# Patient Record
Sex: FEMALE
[De-identification: names, ages, dates, MRNs, and addresses within clinical notes are randomized per-mention and may not be internally consistent; named-entity substitution may affect disease eponyms.]

---

## 2018-03-11 ENCOUNTER — HOSPITAL ENCOUNTER (EMERGENCY)
Dept: HOSPITAL 88 - ER | Age: 38
Discharge: LEFT BEFORE DISCHARGE | End: 2018-03-11
Payer: COMMERCIAL

## 2018-03-11 DIAGNOSIS — E80.7: Primary | ICD-10-CM

## 2019-02-02 ENCOUNTER — HOSPITAL ENCOUNTER (INPATIENT)
Dept: HOSPITAL 88 - ER | Age: 39
LOS: 2 days | Discharge: HOME | DRG: 871 | End: 2019-02-04
Attending: INTERNAL MEDICINE | Admitting: INTERNAL MEDICINE
Payer: COMMERCIAL

## 2019-02-02 VITALS — SYSTOLIC BLOOD PRESSURE: 147 MMHG | DIASTOLIC BLOOD PRESSURE: 76 MMHG

## 2019-02-02 VITALS — SYSTOLIC BLOOD PRESSURE: 139 MMHG | DIASTOLIC BLOOD PRESSURE: 64 MMHG

## 2019-02-02 VITALS — DIASTOLIC BLOOD PRESSURE: 71 MMHG | SYSTOLIC BLOOD PRESSURE: 139 MMHG

## 2019-02-02 VITALS — SYSTOLIC BLOOD PRESSURE: 139 MMHG | DIASTOLIC BLOOD PRESSURE: 71 MMHG

## 2019-02-02 VITALS — HEIGHT: 64 IN | WEIGHT: 212 LBS | BODY MASS INDEX: 36.19 KG/M2

## 2019-02-02 DIAGNOSIS — E03.9: ICD-10-CM

## 2019-02-02 DIAGNOSIS — Z79.4: ICD-10-CM

## 2019-02-02 DIAGNOSIS — I10: ICD-10-CM

## 2019-02-02 DIAGNOSIS — Z83.3: ICD-10-CM

## 2019-02-02 DIAGNOSIS — K76.9: ICD-10-CM

## 2019-02-02 DIAGNOSIS — A41.9: Primary | ICD-10-CM

## 2019-02-02 DIAGNOSIS — E11.65: ICD-10-CM

## 2019-02-02 DIAGNOSIS — K76.0: ICD-10-CM

## 2019-02-02 DIAGNOSIS — J18.9: ICD-10-CM

## 2019-02-02 DIAGNOSIS — E86.0: ICD-10-CM

## 2019-02-02 DIAGNOSIS — R16.0: ICD-10-CM

## 2019-02-02 DIAGNOSIS — Z88.8: ICD-10-CM

## 2019-02-02 DIAGNOSIS — R19.03: ICD-10-CM

## 2019-02-02 DIAGNOSIS — Z82.49: ICD-10-CM

## 2019-02-02 LAB
ALBUMIN SERPL-MCNC: 4.1 G/DL (ref 3.5–5)
ALBUMIN/GLOB SERPL: 1.2 {RATIO} (ref 0.8–2)
ALP SERPL-CCNC: 104 IU/L (ref 40–150)
ALT SERPL-CCNC: 102 IU/L (ref 0–55)
AMYLASE SERPL-CCNC: 25 U/L (ref 25–125)
ANION GAP SERPL CALC-SCNC: 17.1 MMOL/L (ref 8–16)
BACTERIA URNS QL MICRO: (no result) /HPF
BASOPHILS # BLD AUTO: 0 10*3/UL (ref 0–0.1)
BASOPHILS NFR BLD AUTO: 0.4 % (ref 0–1)
BILIRUB UR QL: NEGATIVE
BUN SERPL-MCNC: 8 MG/DL (ref 7–26)
BUN/CREAT SERPL: 9 (ref 6–25)
CALCIUM SERPL-MCNC: 9.5 MG/DL (ref 8.4–10.2)
CHLORIDE SERPL-SCNC: 98 MMOL/L (ref 98–107)
CLARITY UR: CLEAR
CO2 SERPL-SCNC: 23 MMOL/L (ref 22–29)
COLOR UR: YELLOW
DEPRECATED NEUTROPHILS # BLD AUTO: 6.3 10*3/UL (ref 2.1–6.9)
DEPRECATED RBC URNS MANUAL-ACNC: (no result) /HPF (ref 0–5)
EGFRCR SERPLBLD CKD-EPI 2021: > 60 ML/MIN (ref 60–?)
EOSINOPHIL # BLD AUTO: 0.1 10*3/UL (ref 0–0.4)
EOSINOPHIL NFR BLD AUTO: 1.3 % (ref 0–6)
EPI CELLS URNS QL MICRO: (no result) /LPF
ERYTHROCYTE [DISTWIDTH] IN CORD BLOOD: 12.2 % (ref 11.7–14.4)
GLOBULIN PLAS-MCNC: 3.3 G/DL (ref 2.3–3.5)
GLUCOSE SERPLBLD-MCNC: 194 MG/DL (ref 74–118)
HCG UR QL: NEGATIVE
HCT VFR BLD AUTO: 42.4 % (ref 34.2–44.1)
HGB BLD-MCNC: 15.1 G/DL (ref 12–16)
KETONES UR QL STRIP.AUTO: NEGATIVE
LEUKOCYTE ESTERASE UR QL STRIP.AUTO: NEGATIVE
LIPASE SERPL-CCNC: 31 U/L (ref 8–78)
LYMPHOCYTES # BLD: 1.1 10*3/UL (ref 1–3.2)
LYMPHOCYTES NFR BLD AUTO: 13.4 % (ref 18–39.1)
MCH RBC QN AUTO: 30.4 PG (ref 28–32)
MCHC RBC AUTO-ENTMCNC: 35.6 G/DL (ref 31–35)
MCV RBC AUTO: 85.5 FL (ref 81–99)
MONOCYTES # BLD AUTO: 0.6 10*3/UL (ref 0.2–0.8)
MONOCYTES NFR BLD AUTO: 7.1 % (ref 4.4–11.3)
NEUTS SEG NFR BLD AUTO: 77.2 % (ref 38.7–80)
NITRITE UR QL STRIP.AUTO: NEGATIVE
PLATELET # BLD AUTO: 401 X10E3/UL (ref 140–360)
POTASSIUM SERPL-SCNC: 4.1 MMOL/L (ref 3.5–5.1)
PROT UR QL STRIP.AUTO: NEGATIVE
RBC # BLD AUTO: 4.96 X10E6/UL (ref 3.6–5.1)
SODIUM SERPL-SCNC: 134 MMOL/L (ref 136–145)
SP GR UR STRIP: 1 (ref 1.01–1.02)
UROBILINOGEN UR STRIP-MCNC: 0.2 MG/DL (ref 0.2–1)
WBC #/AREA URNS HPF: (no result) /HPF (ref 0–5)

## 2019-02-02 PROCEDURE — 93005 ELECTROCARDIOGRAM TRACING: CPT

## 2019-02-02 PROCEDURE — 87040 BLOOD CULTURE FOR BACTERIA: CPT

## 2019-02-02 PROCEDURE — 81001 URINALYSIS AUTO W/SCOPE: CPT

## 2019-02-02 PROCEDURE — 83690 ASSAY OF LIPASE: CPT

## 2019-02-02 PROCEDURE — 80053 COMPREHEN METABOLIC PANEL: CPT

## 2019-02-02 PROCEDURE — 87400 INFLUENZA A/B EACH AG IA: CPT

## 2019-02-02 PROCEDURE — 85025 COMPLETE CBC W/AUTO DIFF WBC: CPT

## 2019-02-02 PROCEDURE — 71046 X-RAY EXAM CHEST 2 VIEWS: CPT

## 2019-02-02 PROCEDURE — 74183 MRI ABD W/O CNTR FLWD CNTR: CPT

## 2019-02-02 PROCEDURE — 85610 PROTHROMBIN TIME: CPT

## 2019-02-02 PROCEDURE — 99284 EMERGENCY DEPT VISIT MOD MDM: CPT

## 2019-02-02 PROCEDURE — 83735 ASSAY OF MAGNESIUM: CPT

## 2019-02-02 PROCEDURE — 36415 COLL VENOUS BLD VENIPUNCTURE: CPT

## 2019-02-02 PROCEDURE — 83605 ASSAY OF LACTIC ACID: CPT

## 2019-02-02 PROCEDURE — 82948 REAGENT STRIP/BLOOD GLUCOSE: CPT

## 2019-02-02 PROCEDURE — 74177 CT ABD & PELVIS W/CONTRAST: CPT

## 2019-02-02 PROCEDURE — 82150 ASSAY OF AMYLASE: CPT

## 2019-02-02 PROCEDURE — 84443 ASSAY THYROID STIM HORMONE: CPT

## 2019-02-02 PROCEDURE — 87070 CULTURE OTHR SPECIMN AEROBIC: CPT

## 2019-02-02 PROCEDURE — 87086 URINE CULTURE/COLONY COUNT: CPT

## 2019-02-02 PROCEDURE — 81025 URINE PREGNANCY TEST: CPT

## 2019-02-02 PROCEDURE — 83518 IMMUNOASSAY DIPSTICK: CPT

## 2019-02-02 RX ADMIN — SODIUM CHLORIDE ONE MLS/HR: 9 INJECTION, SOLUTION INTRAVENOUS at 13:30

## 2019-02-02 RX ADMIN — BENZONATATE SCH MG: 100 CAPSULE ORAL at 20:36

## 2019-02-02 RX ADMIN — BENZONATATE SCH MG: 100 CAPSULE ORAL at 14:50

## 2019-02-02 RX ADMIN — METOPROLOL TARTRATE SCH MG: 25 TABLET, FILM COATED ORAL at 22:00

## 2019-02-02 RX ADMIN — SODIUM CHLORIDE ONE MLS/HR: 9 INJECTION, SOLUTION INTRAVENOUS at 13:49

## 2019-02-02 RX ADMIN — INSULIN LISPRO SCH UNIT: 100 INJECTION, SOLUTION INTRAVENOUS; SUBCUTANEOUS at 20:37

## 2019-02-02 RX ADMIN — SODIUM CHLORIDE SCH MLS/HR: 900 INJECTION, SOLUTION INTRAVENOUS at 14:12

## 2019-02-02 RX ADMIN — METOPROLOL TARTRATE SCH MG: 25 TABLET, FILM COATED ORAL at 14:50

## 2019-02-02 RX ADMIN — INSULIN LISPRO SCH UNIT: 100 INJECTION, SOLUTION INTRAVENOUS; SUBCUTANEOUS at 16:30

## 2019-02-02 NOTE — DIAGNOSTIC IMAGING REPORT
CT Abdomen And Pelvis with Intravenous Contrast



INDICATION:   

^ABD PAIN, N/V, ELEV LFT'S



TECHNIQUE: Thin collimation axial images obtained from the diaphragm to the

level of the pubic symphysis following the uneventful administration of  100 cc

of low osmolar, nonionic intravenous contrast.



Dose reduction techniques used: Automated exposure control, adjustment of the

mAs and/or kVp according to patient size, standardized low-dose protocol,

and/or iterative reconstruction technique.





RADIATION DOSE:

     Total DLP: 709.6 mGy*cm    

     Estimated effective dose: (DLP x 0.015 x size factor) mSv

     CTDIvol has been reviewed. It is below the limits set by the Radiation

Protocol Committee (RPC).



COMPARISON: CT abdomen/pelvis 3/14/2013, CTA abdomen/pelvis 5/29/2013.

 

ABDOMEN FINDINGS:



Lung Bases: Nodule in the anterior basal segment of the right lower lobe is

stable and measures 3 mm. No pulmonary infiltrates. Visualized portion of the

mediastinum is normal.



Liver: Decreased attenuation consistent with steatosis. The right lobe measures

23 cm in length. 



*  New predominantly hypoattenuating, heterogeneously enhancing lesion in

segment 2 near the capsule measures 2.6 x 3.9 cm. 

*  New focus of hypoattenuation in the anterior aspect of segment 4 measures

2.8 x 1.4 cm in the coronal plane. There is a nodular focus of increased

attenuation at its superior aspect (coronal image 23).

*  New predominantly hypoattenuating, heterogeneously enhancing lesion in

segment 6 measures 4.2 x 2.1 cm adjacent to the posterior capsule. 

*  New hypoattenuating lesion in segment 5 is somewhat curvilinear in

appearance and extends for approximately 3 cm.

*  New subcapsular focus of hypoattenuation the medial aspect of segment 6

measures 11 mm (series 2, image 31).

*  New subcapsular focus of hypoattenuation in segment 2/3 (series 2, image

29).



No perihepatic fluid collection or inflammation.



Gallbladder: Present and appears normal. 



Biliary tree: No biliary ductal dilatation.



Pancreas: Normal attenuation without mass or ductal dilatation.



Spleen: Top normal in size.  No evidence of mass.



Adrenal Glands: No evidence for mass.



Kidneys: 



Right: Normal enhancement.  No soft tissue mass.  No hydronephrosis.



Left:  Normal enhancement.  No soft tissue mass.  No hydronephrosis.



Lymph Nodes: No enlarged abdominal or retroperitoneal lymph nodes.



Aorta:   Normal in diameter.

Portal vein: Measures 16 mm in diameter and is widely patent.



PELVIS FINDINGS: 



Bowel: 

Stomach:  Normal in caliber with normal wall thickness.

Small Bowel: Normal in caliber with normal wall thickness.  

Large Bowel: Normal in caliber with normal wall thickness.  

Appendix: Normal appendix.



Bladder: Normal.



The uterus is present and normal in morphology. No adnexal mass.



Peritoneum/retroperitoneum: No free fluid or fluid collection.



Bones: Unremarkable for age.



Soft tissues: Anterior abdominal wall subcutaneous mass in the right lower

quadrant measures 2.1 x 2.5 cm (previously, 1.3 x 1.8 cm).  No new subcutaneous

masses.



IMPRESSION:



1.   Steatosis and hepatomegaly. New heterogeneously enhancing masses

throughout the parenchyma. This may be the sequela of liver injury or the

result of focal fat deposition. Another entity to consider would be a

neoplastic process given increasing size of the abdominal wall skin mass.

Recommend correlation with trauma history and MRI dedicated to the liver on an

outpatient basis for further evaluation. 



2.   Enlarging right lower quadrant abdominal wall mass of uncertain etiology.

Biopsy/excision is recommended.



3. Top normal size of the spleen and enlarged portal vein suggestive of portal

hypertension. 



4. No evidence of lymphadenopathy.



5. No evidence for bowel obstruction or inflammation. Normal appendix.



Signed by: Dr. Wiliam Armenta MD on 2/2/2019 1:13 PM

## 2019-02-02 NOTE — XMS REPORT
Patient Summary Document

                             Created on: 2019



TALHA CHRISTIAN

External Reference #: [id number]

: 1980

Sex: Female



Demographics







                          Address                   [address] [place] [address]

 

                          Home Phone                [phone]

 

                          Preferred Language        Unknown

 

                          Marital Status            Unknown

 

                          Restorationist Affiliation     Unknown

 

                          Race                      Unknown

 

                                        Additional Race(s)  

 

                          Ethnic Group              Unknown





Author







                          Author                    [facility]

 

                          Address                   Unknown

 

                          Phone                     Unavailable







Care Team Providers







                    Care Team Member Name    Role                Phone

 

                    SWEET, A LAIRD      Unavailable         Unavailable







Problems

This patient has no known problems.



Allergies, Adverse Reactions, Alerts

This patient has no known allergies or adverse reactions.



Medications

This patient has no known medications.



Results







           Test Description    Test Time    Test Comments    Text Results    Atomic Results    Result

 Comments

 

                CT ABDOMEN/PELVIS W    2019 12:59:00                                                       

                                                   [place] [address]      Patient Name: TALHA CHRISTIAN                                
  MR #: [id number]                     : 1980                          
        Age/Sex: 38/F  Acct #: [id number]                              Req #: 
[id number]  Adm Physician:                                                      
Ordered by: GEORGIA SILVA MD                            Report #: [id number]    
   Location: ER                                      Room/Bed:                  
  
___________________________________________________________________________________________________
   Procedure: [id number] CT/CT ABDOMEN/PELVIS W  Exam Date:                      
      Exam Time:                                               REPORT STATUS: 
Signed    CT Abdomen And Pelvis with Intravenous Contrast      INDICATION:      
ABD PAIN, N/V, ELEV LFT'S      TECHNIQUE: Thin collimation axial images obtained
from the diaphragm to the   level of the pubic symphysis following the 
uneventful administration of  100 cc   of low osmolar, nonionic intravenous 
contrast.      Dose reduction techniques used: Automated exposure control, 
adjustment of the   mAs and/or kVp according to patient size, standardized low-
dose protocol,   and/or iterative reconstruction technique.         RADIATION 
DOSE:        Total DLP: 709.6 mGy*cm            Estimated effective dose: (DLP x
0.015 x size factor) mSv        CTDIvol has been reviewed. It is below the 
limits set by the Radiation   Protocol Committee (RPC).      COMPARISON: CT 
abdomen/pelvis 3/14/2013, CTA abdomen/pelvis 2013.       ABDOMEN FINDINGS: 
    Lung Bases: Nodule in the anterior basal segment of the right lower lobe is 
 stable and measures 3 mm. No pulmonary infiltrates. Visualized portion of the  
mediastinum is normal.      Liver: Decreased attenuation consistent with 
steatosis. The right lobe measures   23 cm in length.       *  New predominantly
hypoattenuating, heterogeneously enhancing lesion in   segment 2 near the 
capsule measures 2.6 x 3.9 cm.    *  New focus of hypoattenuation in the 
anterior aspect of segment 4 measures   2.8 x 1.4 cm in the coronal plane. There
is a nodular focus of increased   attenuation at its superior aspect (coronal 
image 23).   *  New predominantly hypoattenuating, heterogeneously enhancing 
lesion in   segment 6 measures 4.2 x 2.1 cm adjacent to the posterior capsule.  
 *  New hypoattenuating lesion in segment 5 is somewhat curvilinear in   
appearance and extends for approximately 3 cm.   *  New subcapsular focus of 
hypoattenuation the medial aspect of segment 6   measures 11 mm (series 2, image
31).   *  New subcapsular focus of hypoattenuation in segment 2/3 (series 2, 
image   29).      No perihepatic fluid collection or inflammation.      
Gallbladder: Present and appears normal.       Biliary tree: No biliary ductal d
ilatation.      Pancreas: Normal attenuation without mass or ductal dilatation. 
    Spleen: Top normal in size.  No evidence of mass.      Adrenal Glands: No 
evidence for mass.      Kidneys:       Right: Normal enhancement.  No soft 
tissue mass.  No hydronephrosis.      Left:  Normal enhancement.  No soft tissue
mass.  No hydronephrosis.      Lymph Nodes: No enlarged abdominal or 
retroperitoneal lymph nodes.      Aorta:   Normal in diameter.   Portal vein: 
Measures 16 mm in diameter and is widely patent.      PELVIS FINDINGS:       
Bowel:    Stomach:  Normal in caliber with normal wall thickness.   Small Bowel:
Normal in caliber with normal wall thickness.     Large Bowel: Normal in caliber
with normal wall thickness.     Appendix: Normal appendix.      Bladder: Normal.
     The uterus is present and normal in morphology. No adnexal mass.      
Peritoneum/retroperitoneum: No free fluid or fluid collection.      Bones: 
Unremarkable for age.      Soft tissues: Anterior abdominal wall subcutaneous ma
ss in the right lower   quadrant measures 2.1 x 2.5 cm (previously, 1.3 x 1.8 
cm).  No new subcutaneous   masses.      IMPRESSION:      1.   Steatosis and 
hepatomegaly. New heterogeneously enhancing masses   throughout the parenchyma. 
This may be the sequela of liver injury or the   result of focal fat deposition.
Another entity to consider would be a   neoplastic process given increasing size
of the abdominal wall skin mass.   Recommend correlation with trauma history and
MRI dedicated to the liver on an   outpatient basis for further evaluation.     
 2.   Enlarging right lower quadrant abdominal wall mass of uncertain etiology. 
 Biopsy/excision is recommended.      3. Top normal size of the spleen and 
enlarged portal vein suggestive of portal   hypertension.       4. No evidence 
of lymphadenopathy.      5. No evidence for bowel obstruction or inflammation. 
Normal appendix.      Signed by: Dr. Gulshan Armenta MD on 2019 1:13 PM  
     Dictated By: GULSHAN ARMENTA MD  Electronically Signed By: GULSHAN ARMENTA MD on 19 1313  Transcribed By: ALYSSA on 19 1313       COPY 
TO:   GEORGIA SILVA MD                   

 

                CHEST 2 VIEWS    2019 11:32:00                                                             

                                             [place] [address]  
   Patient Name: TALHA CHRISTIAN                                   MR #: 
[id number]                     : 1980                                  
Age/Sex: 38/F  Acct #: [id number]                              Req #: [id number]
[id number]  Adm Physician:                                                      
Ordered by: GEORGIA SILVA MD                            Report #: [id number]    
   Location: ER                                      Room/Bed:                  
  
___________________________________________________________________________________________________
   Procedure: [id number] DX/CHEST 2 VIEWS  Exam Date:                            
Exam Time:                                               REPORT STATUS: Signed  
 EXAMINATION:  CHEST 2 VIEWS          INDICATION:     SEPSIS    Y      CO
MPARISON:  Chest x-ray 2013           FINDINGS:  PA and lateral views.  The
patient's arms overlie the upper chest.      TUBES and LINES:  None.      LUNGS:
 Lungs are well inflated.  Right infrahilar airspace opacity corresponds   to 
the right lower lobe. Left lung is clear.      PLEURA:  No pleural effusion or 
pneumothorax.      HEART AND MEDIASTINUM:  The cardiomediastinal silhouette is 
unremarkable..        BONES AND SOFT TISSUES:  No focal osseous lesions.   Soft 
tissues are   unremarkable.      UPPER ABDOMEN: No free air under the diaphragm.
      IMPRESSION:    Right lower lobe airspace opacity is suggestive of 
pneumonia. Recommend close   interval follow-up.         Signed by: Dr. Gulshan Armenat MD on 2019 11:34 AM        Dictated By: GULSHAN ARMENTA MD  
Electronically Signed By: GULSHAN ARMENTA MD on 19 1134  Transcribed By:
ALYSSA on 19 1134       COPY TO:   GEORGIA SILVA MD

## 2019-02-02 NOTE — NUR
This 38 y/0 female admitted the unit from the ER with dx of pneumonia, 
dehydration,nausea,vomiting and heart rate in 120 range/ The pt arrived with tele and  
monitor in place Sinus tach at 118. There is an iv to the right ac with n/s infusing @150 
cc/hr. Blood glucose 184. Antibiotic is infusing as well. The pt. is awake, alert and 
oriented times 4. She was initiated to the room.

## 2019-02-02 NOTE — DIAGNOSTIC IMAGING REPORT
EXAMINATION:  CHEST 2 VIEWS    



INDICATION: 

^SEPSIS

^Y



COMPARISON:  Chest x-ray 5/29/2013

     

FINDINGS:  PA and lateral views.  The patient's arms overlie the upper chest.



TUBES and LINES:  None.



LUNGS:  Lungs are well inflated.  Right infrahilar airspace opacity corresponds

to the right lower lobe. Left lung is clear.



PLEURA:  No pleural effusion or pneumothorax.



HEART AND MEDIASTINUM:  The cardiomediastinal silhouette is unremarkable..  



BONES AND SOFT TISSUES:  No focal osseous lesions.   Soft tissues are

unremarkable.



UPPER ABDOMEN: No free air under the diaphragm. 



IMPRESSION: 

Right lower lobe airspace opacity is suggestive of pneumonia. Recommend close

interval follow-up.





Signed by: Dr. Wiliam Armenta MD on 2/2/2019 11:34 AM

## 2019-02-03 VITALS — SYSTOLIC BLOOD PRESSURE: 122 MMHG | DIASTOLIC BLOOD PRESSURE: 69 MMHG

## 2019-02-03 VITALS — SYSTOLIC BLOOD PRESSURE: 100 MMHG | DIASTOLIC BLOOD PRESSURE: 52 MMHG

## 2019-02-03 VITALS — DIASTOLIC BLOOD PRESSURE: 59 MMHG | SYSTOLIC BLOOD PRESSURE: 112 MMHG

## 2019-02-03 VITALS — DIASTOLIC BLOOD PRESSURE: 68 MMHG | SYSTOLIC BLOOD PRESSURE: 123 MMHG

## 2019-02-03 VITALS — SYSTOLIC BLOOD PRESSURE: 139 MMHG | DIASTOLIC BLOOD PRESSURE: 65 MMHG

## 2019-02-03 LAB
ALBUMIN SERPL-MCNC: 3.3 G/DL (ref 3.5–5)
ALBUMIN/GLOB SERPL: 1.2 {RATIO} (ref 0.8–2)
ALP SERPL-CCNC: 84 IU/L (ref 40–150)
ALT SERPL-CCNC: 85 IU/L (ref 0–55)
ANION GAP SERPL CALC-SCNC: 13.8 MMOL/L (ref 8–16)
BASOPHILS # BLD AUTO: 0 10*3/UL (ref 0–0.1)
BASOPHILS NFR BLD AUTO: 0.1 % (ref 0–1)
BUN SERPL-MCNC: 6 MG/DL (ref 7–26)
BUN/CREAT SERPL: 7 (ref 6–25)
CALCIUM SERPL-MCNC: 8.3 MG/DL (ref 8.4–10.2)
CHLORIDE SERPL-SCNC: 103 MMOL/L (ref 98–107)
CO2 SERPL-SCNC: 23 MMOL/L (ref 22–29)
DEPRECATED INR PLAS: 1
DEPRECATED NEUTROPHILS # BLD AUTO: 4.2 10*3/UL (ref 2.1–6.9)
EGFRCR SERPLBLD CKD-EPI 2021: > 60 ML/MIN (ref 60–?)
EOSINOPHIL # BLD AUTO: 0 10*3/UL (ref 0–0.4)
EOSINOPHIL NFR BLD AUTO: 0.6 % (ref 0–6)
ERYTHROCYTE [DISTWIDTH] IN CORD BLOOD: 12.3 % (ref 11.7–14.4)
GLOBULIN PLAS-MCNC: 2.8 G/DL (ref 2.3–3.5)
GLUCOSE SERPLBLD-MCNC: 210 MG/DL (ref 74–118)
HCT VFR BLD AUTO: 38 % (ref 34.2–44.1)
HGB BLD-MCNC: 12.7 G/DL (ref 12–16)
LYMPHOCYTES # BLD: 1.8 10*3/UL (ref 1–3.2)
LYMPHOCYTES NFR BLD AUTO: 26.6 % (ref 18–39.1)
MCH RBC QN AUTO: 30 PG (ref 28–32)
MCHC RBC AUTO-ENTMCNC: 33.4 G/DL (ref 31–35)
MCV RBC AUTO: 89.6 FL (ref 81–99)
MONOCYTES # BLD AUTO: 0.6 10*3/UL (ref 0.2–0.8)
MONOCYTES NFR BLD AUTO: 9.3 % (ref 4.4–11.3)
NEUTS SEG NFR BLD AUTO: 62.8 % (ref 38.7–80)
PLATELET # BLD AUTO: 307 X10E3/UL (ref 140–360)
POTASSIUM SERPL-SCNC: 3.8 MMOL/L (ref 3.5–5.1)
PROTHROMBIN TIME: 14.1 SECONDS (ref 11.9–14.5)
RBC # BLD AUTO: 4.24 X10E6/UL (ref 3.6–5.1)
SODIUM SERPL-SCNC: 136 MMOL/L (ref 136–145)

## 2019-02-03 RX ADMIN — INSULIN LISPRO SCH UNIT: 100 INJECTION, SOLUTION INTRAVENOUS; SUBCUTANEOUS at 20:59

## 2019-02-03 RX ADMIN — CEFTRIAXONE SCH MLS/HR: 100 INJECTION, POWDER, FOR SOLUTION INTRAVENOUS at 09:00

## 2019-02-03 RX ADMIN — INSULIN LISPRO SCH UNIT: 100 INJECTION, SOLUTION INTRAVENOUS; SUBCUTANEOUS at 16:30

## 2019-02-03 RX ADMIN — BENZONATATE SCH MG: 100 CAPSULE ORAL at 15:00

## 2019-02-03 RX ADMIN — BENZONATATE SCH MG: 100 CAPSULE ORAL at 20:58

## 2019-02-03 RX ADMIN — BENZONATATE SCH MG: 100 CAPSULE ORAL at 09:00

## 2019-02-03 RX ADMIN — SODIUM CHLORIDE SCH MLS/HR: 900 INJECTION, SOLUTION INTRAVENOUS at 09:20

## 2019-02-03 RX ADMIN — INSULIN LISPRO SCH UNIT: 100 INJECTION, SOLUTION INTRAVENOUS; SUBCUTANEOUS at 07:30

## 2019-02-03 RX ADMIN — INSULIN LISPRO SCH UNIT: 100 INJECTION, SOLUTION INTRAVENOUS; SUBCUTANEOUS at 11:30

## 2019-02-03 NOTE — NUR
patient received. Patient is resting in bed. Resp even and unlabored. No acute distress 
noted. Patient denies of any pain or discomfort. tele in place. family at bed time. call 
light within reach. instruct to call for assistance. bed low/locked. continue to monitor 
closely

## 2019-02-03 NOTE — NUR
RCD PT AT BED PT IS ALERT AND ORIENTED  ASSESSMENT DONE PT RESTING ON BED NO SIGNS OF ANY 
DISTRESS NOTED IV PATENT BED LOW AND LOCKED CALL LIGHT IN REACH

## 2019-02-03 NOTE — NUR
Patient laying in bed with HOB slightly elevated.  AAO x 4.  No sob noted.  No acute 
distress noted.  Patient reports of 0/10 for pain.  Bed at low position and locked.  Call 
light within reach and reminded patient to utilize when help is needed.  Patient in stable 
condition and will continue to monitor.  Patient noted with temp at 103.5 and HR at 140, 
also noted covered under 6 blankets and room temp very hot.  Removed blankets, lower room 
temp and rechecked in 30mins and temp at 100.6 and HR at 122.  Patient reports of no chest 
pain, no headache.

## 2019-02-03 NOTE — NUR
pt c/o pain on iv site notified the charge nurse to start the iv since she is hard sticker 
in er they used by ultrasound and get  it

## 2019-02-03 NOTE — NUR
SOCIAL WORK INITIAL ASSESSMENT 

 met with patient at bedside to discuss plan of care with patient/family. CM/SW 
role and care transitions discussed. 

Patient lives: with her two children (age 6 and 8) 

Admit/Transfer: Admit

POA/Emergency contact: Cooper Sherwood (boyfriend) [phone]

Current/Previous Home Health: N/A

PCP/Follow-up Care: Does not have Dr. Vipul Mancera

Current/Previous DME:  N/A

Other Services: 

Employment Status:  works for [employer]

Areas of Concerns: 

Referral Needs: 

Education Needs: 

IMM/MOON given and signed (if applicable): 

Goal for discharge:

Patient states children are being cared for by her boyfriends sister while in the hospital. 
Patient states she was independent of all ADL goal prior to hospital admission.

## 2019-02-04 VITALS — SYSTOLIC BLOOD PRESSURE: 119 MMHG | DIASTOLIC BLOOD PRESSURE: 68 MMHG

## 2019-02-04 VITALS — SYSTOLIC BLOOD PRESSURE: 150 MMHG | DIASTOLIC BLOOD PRESSURE: 70 MMHG

## 2019-02-04 VITALS — DIASTOLIC BLOOD PRESSURE: 75 MMHG | SYSTOLIC BLOOD PRESSURE: 139 MMHG

## 2019-02-04 VITALS — DIASTOLIC BLOOD PRESSURE: 71 MMHG | SYSTOLIC BLOOD PRESSURE: 153 MMHG

## 2019-02-04 VITALS — DIASTOLIC BLOOD PRESSURE: 86 MMHG | SYSTOLIC BLOOD PRESSURE: 160 MMHG

## 2019-02-04 RX ADMIN — INSULIN LISPRO SCH UNIT: 100 INJECTION, SOLUTION INTRAVENOUS; SUBCUTANEOUS at 11:30

## 2019-02-04 RX ADMIN — INSULIN LISPRO SCH UNIT: 100 INJECTION, SOLUTION INTRAVENOUS; SUBCUTANEOUS at 16:30

## 2019-02-04 RX ADMIN — CEFTRIAXONE SCH MLS/HR: 100 INJECTION, POWDER, FOR SOLUTION INTRAVENOUS at 09:00

## 2019-02-04 RX ADMIN — BENZONATATE SCH MG: 100 CAPSULE ORAL at 09:00

## 2019-02-04 RX ADMIN — INSULIN LISPRO SCH UNIT: 100 INJECTION, SOLUTION INTRAVENOUS; SUBCUTANEOUS at 07:30

## 2019-02-04 RX ADMIN — CEFTRIAXONE SCH MLS/HR: 100 INJECTION, POWDER, FOR SOLUTION INTRAVENOUS at 13:00

## 2019-02-04 NOTE — DISCHARGE SUMMARY
PRIMARY CARE DOCTOR:  Dr. Sandoval Corrales.



FINAL DIAGNOSIS:  Sepsis present on admission due to community-acquired 

pneumonia.



SECONDARY DIAGNOSES

1. Liver lesions on imaging studies.

2. Diabetes.

3. Hypertension.



PROCEDURES/STUDIES PERFORMED

1. Computed tomography of the abdomen and pelvis. 

2. Magnetic resonance imaging of the liver. 



CONSULTANTS:  None.



HISTORY:  Per H&P.



HOSPITAL COURSE:  Patient was aggressively hydrated. She did spike a fever 

of 103. Finally her tachycardia got better. Patient has been afebrile for 

more than 24 hours now. Her influenza was negative. Patient's initial AST 

was 110, ALT was 102. INR is normal. Chest x-ray shows a right lower lobe 

pneumonia. Repeat LFTs were better. A CT shows liver lesions possibly due 

to fatty liver, also cannot rule out a liver injury potentially due to 

hypoperfusion with her sepsis. Currently we are waiting for MRI of the 

liver to be done. If unremarkable, patient will be going home. Her acute 

hepatitis panel is still pending. Patient also has a right lower quadrant 

just above her groin soft-tissue nodule. This was present on a CT scan six 

years ago. Patient stated she has been evaluated by a dermatologist before 

and thought to be a cyst. It is nontender. Patient will be going home on 

five more days of azithromycin. She received IV Rocephin and IV 

azithromycin here. Patient was seen and examined today. I will follow up on 

her hepatitis panel. It took 33 minutes total to discharge this patient.



CONDITION ON DISCHARGE:  Improved.



DISCHARGE MEDICATIONS:  Please see medication reconciliation form.





 



 _________________________________

SEAN NOVA M.D.



DD:  02/04/2019 11:17

DT:  02/04/2019 12:31

Job#:  [id number]

cc:SANDOVAL CORRALES MD

## 2019-02-04 NOTE — NUR
RCD PT AT BED PT IS ALERT AND ORIENTED  ASSESSMENT DONE PT RESTING ON BED NO SIGNS OF ANY 
DISTRESS NOTED BED LOW AND LOCKED CALL LIGHT IN REACH

## 2023-06-15 ENCOUNTER — HOSPITAL ENCOUNTER (EMERGENCY)
Dept: HOSPITAL 88 - ER | Age: 43
Discharge: HOME | End: 2023-06-15
Payer: COMMERCIAL

## 2023-06-15 VITALS — HEIGHT: 64 IN | WEIGHT: 212 LBS | BODY MASS INDEX: 36.19 KG/M2

## 2023-06-15 VITALS
DIASTOLIC BLOOD PRESSURE: 65 MMHG | OXYGEN SATURATION: 99 % | SYSTOLIC BLOOD PRESSURE: 114 MMHG | RESPIRATION RATE: 19 BRPM | HEART RATE: 98 BPM | TEMPERATURE: 97.9 F

## 2023-06-15 DIAGNOSIS — E11.65: ICD-10-CM

## 2023-06-15 DIAGNOSIS — E03.9: ICD-10-CM

## 2023-06-15 DIAGNOSIS — Z20.822: ICD-10-CM

## 2023-06-15 DIAGNOSIS — R94.31: ICD-10-CM

## 2023-06-15 DIAGNOSIS — E86.0: ICD-10-CM

## 2023-06-15 DIAGNOSIS — K76.9: ICD-10-CM

## 2023-06-15 DIAGNOSIS — R11.2: Primary | ICD-10-CM

## 2023-06-15 DIAGNOSIS — R19.7: ICD-10-CM

## 2023-06-15 LAB
ALBUMIN SERPL-MCNC: 4.1 G/DL (ref 3.5–5)
ALBUMIN/GLOB SERPL: 0.9 {RATIO} (ref 0.8–2)
ALP SERPL-CCNC: 99 IU/L (ref 40–150)
ALT SERPL-CCNC: 18 IU/L (ref 0–55)
ANION GAP SERPL CALC-SCNC: 17 MMOL/L (ref 8–16)
BACTERIA URNS QL MICRO: (no result) /HPF
BASOPHILS # BLD AUTO: 0 10*3/UL (ref 0–0.1)
BASOPHILS NFR BLD AUTO: 0.2 % (ref 0–1)
BUN SERPL-MCNC: 13 MG/DL (ref 7–26)
BUN/CREAT SERPL: 13 (ref 6–25)
CALCIUM SERPL-MCNC: 9.2 MG/DL (ref 8.4–10.2)
CHLORIDE SERPL-SCNC: 100 MMOL/L (ref 98–107)
CK SERPL-CCNC: 21 IU/L (ref 29–168)
CLARITY UR: (no result)
CO2 SERPL-SCNC: 21 MMOL/L (ref 22–29)
COLOR UR: YELLOW
DEPRECATED APTT PLAS QN: 29.9 SECONDS (ref 23.8–35.5)
DEPRECATED INR PLAS: 0.97
DEPRECATED NEUTROPHILS # BLD AUTO: 8.7 10*3/UL (ref 2.1–6.9)
DEPRECATED RBC URNS MANUAL-ACNC: (no result) /HPF (ref 0–5)
EOSINOPHIL # BLD AUTO: 0.2 10*3/UL (ref 0–0.4)
EOSINOPHIL NFR BLD AUTO: 1.6 % (ref 0–6)
EPI CELLS URNS QL MICRO: (no result) /LPF
ERYTHROCYTE [DISTWIDTH] IN CORD BLOOD: 12.9 % (ref 11.7–14.4)
GLOBULIN PLAS-MCNC: 4.6 G/DL (ref 2.3–3.5)
GLUCOSE SERPLBLD-MCNC: 219 MG/DL (ref 74–118)
HCT VFR BLD AUTO: 49.2 % (ref 34.2–44.1)
HGB BLD-MCNC: 16.9 G/DL (ref 12–16)
KETONES UR QL STRIP.AUTO: (no result)
LEUKOCYTE ESTERASE UR QL STRIP.AUTO: NEGATIVE
LIPASE SERPL-CCNC: 20 U/L (ref 8–78)
LYMPHOCYTES # BLD: 2.5 10*3/UL (ref 1–3.2)
LYMPHOCYTES NFR BLD AUTO: 20.9 % (ref 18–39.1)
MAGNESIUM SERPL-MCNC: 1.8 MG/DL (ref 1.3–2.1)
MCH RBC QN AUTO: 30.2 PG (ref 28–32)
MCHC RBC AUTO-ENTMCNC: 34.3 G/DL (ref 31–35)
MCV RBC AUTO: 88 FL (ref 81–99)
MONOCYTES # BLD AUTO: 0.7 10*3/UL (ref 0.2–0.8)
MONOCYTES NFR BLD AUTO: 5.4 % (ref 4.4–11.3)
MUCOUS THREADS URNS QL MICRO: (no result)
NEUTS SEG NFR BLD AUTO: 71.7 % (ref 38.7–80)
NITRITE UR QL STRIP.AUTO: NEGATIVE
PLATELET # BLD AUTO: 529 X10E3/UL (ref 140–360)
POTASSIUM SERPL-SCNC: 4 MMOL/L (ref 3.5–5.1)
PROT UR QL STRIP.AUTO: (no result)
PROTHROMBIN TIME: 13.4 SECONDS (ref 11.9–14.5)
RBC # BLD AUTO: 5.59 X10E6/UL (ref 3.6–5.1)
SODIUM SERPL-SCNC: 134 MMOL/L (ref 136–145)
SP GR UR STRIP: >=1.03 (ref 1.01–1.02)
TSH SERPL DL<=0.005 MIU/L-ACNC: 1.97 UIU/ML (ref 0.35–4.94)
UROBILINOGEN UR STRIP-MCNC: 0.2 MG/DL (ref 0.2–1)
WBC #/AREA URNS HPF: (no result) /HPF (ref 0–5)

## 2023-06-15 PROCEDURE — 99284 EMERGENCY DEPT VISIT MOD MDM: CPT

## 2023-06-15 PROCEDURE — 74177 CT ABD & PELVIS W/CONTRAST: CPT

## 2023-06-15 PROCEDURE — 85610 PROTHROMBIN TIME: CPT

## 2023-06-15 PROCEDURE — 83605 ASSAY OF LACTIC ACID: CPT

## 2023-06-15 PROCEDURE — 82550 ASSAY OF CK (CPK): CPT

## 2023-06-15 PROCEDURE — 83735 ASSAY OF MAGNESIUM: CPT

## 2023-06-15 PROCEDURE — 93005 ELECTROCARDIOGRAM TRACING: CPT

## 2023-06-15 PROCEDURE — 87040 BLOOD CULTURE FOR BACTERIA: CPT

## 2023-06-15 PROCEDURE — 85379 FIBRIN DEGRADATION QUANT: CPT

## 2023-06-15 PROCEDURE — 83690 ASSAY OF LIPASE: CPT

## 2023-06-15 PROCEDURE — 84443 ASSAY THYROID STIM HORMONE: CPT

## 2023-06-15 PROCEDURE — 87086 URINE CULTURE/COLONY COUNT: CPT

## 2023-06-15 PROCEDURE — 84702 CHORIONIC GONADOTROPIN TEST: CPT

## 2023-06-15 PROCEDURE — 85730 THROMBOPLASTIN TIME PARTIAL: CPT

## 2023-06-15 PROCEDURE — 81001 URINALYSIS AUTO W/SCOPE: CPT

## 2023-06-15 PROCEDURE — 85025 COMPLETE CBC W/AUTO DIFF WBC: CPT

## 2023-06-15 PROCEDURE — 71045 X-RAY EXAM CHEST 1 VIEW: CPT

## 2023-06-15 PROCEDURE — 80053 COMPREHEN METABOLIC PANEL: CPT

## 2023-06-15 PROCEDURE — 84484 ASSAY OF TROPONIN QUANT: CPT

## 2023-06-15 PROCEDURE — 36415 COLL VENOUS BLD VENIPUNCTURE: CPT
